# Patient Record
Sex: FEMALE | Race: WHITE | Employment: FULL TIME | URBAN - NONMETROPOLITAN AREA
[De-identification: names, ages, dates, MRNs, and addresses within clinical notes are randomized per-mention and may not be internally consistent; named-entity substitution may affect disease eponyms.]

---

## 2023-02-15 RX ORDER — FERROUS SULFATE 325(65) MG
325 TABLET ORAL
COMMUNITY

## 2023-02-21 ENCOUNTER — OFFICE VISIT (OUTPATIENT)
Dept: CARDIOLOGY CLINIC | Age: 43
End: 2023-02-21
Payer: OTHER GOVERNMENT

## 2023-02-21 VITALS
DIASTOLIC BLOOD PRESSURE: 76 MMHG | OXYGEN SATURATION: 99 % | BODY MASS INDEX: 43.07 KG/M2 | HEART RATE: 60 BPM | SYSTOLIC BLOOD PRESSURE: 128 MMHG | HEIGHT: 66 IN | WEIGHT: 268 LBS

## 2023-02-21 DIAGNOSIS — R00.2 PALPITATION: Primary | ICD-10-CM

## 2023-02-21 DIAGNOSIS — R55 SYNCOPE, UNSPECIFIED SYNCOPE TYPE: ICD-10-CM

## 2023-02-21 PROCEDURE — 93000 ELECTROCARDIOGRAM COMPLETE: CPT | Performed by: INTERNAL MEDICINE

## 2023-02-21 PROCEDURE — 99205 OFFICE O/P NEW HI 60 MIN: CPT | Performed by: INTERNAL MEDICINE

## 2023-02-21 NOTE — PROGRESS NOTES
[place] is a 38 y.o. female presents with syncope. She said she works at [employer] and she was walking around and felt lightheaded. She sat down to have a bowel movement and then when she stood up her heart rate was fast although she did not lose consciousness. She felt like she needed to hold onto something or fall and when she got in the car she was extremely thirsty. She normally drinks water and normally her urine has some color to it. She has had orthostatic symptoms in the past.      Review of Systems   Constitutional: Negative for fever, chills, diaphoresis, activity change, appetite change, fatigue and unexpected weight change. Eyes: Negative for photophobia, pain, redness and visual disturbance. Respiratory: Negative for apnea, cough, chest tightness, shortness of breath, wheezing and stridor. Cardiovascular: Negative for chest pain, palpitations and leg swelling. Gastrointestinal: Negative for abdominal distention. Genitourinary: Negative for dysuria, urgency and frequency. Musculoskeletal: Negative for myalgias, arthralgias and gait problem. Skin: Negative for color change, pallor, rash and wound. Neurological: Negative for dizziness, tremors, speech difficulty, weakness and numbness. Hematological: Does not bruise/bleed easily. Psychiatric/Behavioral: Negative.         Past Medical History:   Diagnosis Date    Syncope        Past Surgical History:   Procedure Laterality Date    ACHILLES TENDON SURGERY Bilateral     ENDOMETRIAL ABLATION  01/31/2023       Family History   Problem Relation Age of Onset    Heart Disease Mother     Diabetes Mother     Heart Surgery Mother        Social History     Socioeconomic History    Marital status:      Spouse name: Not on file    Number of children: Not on file    Years of education: Not on file    Highest education level: Not on file   Occupational History    Not on file   Tobacco Use    Smoking status: Never    Smokeless tobacco: Never   Vaping Use    Vaping Use: Never used   Substance and Sexual Activity    Alcohol use: Not Currently    Drug use: Not Currently    Sexual activity: Not on file   Other Topics Concern    Not on file   Social History Narrative    Not on file     Social Determinants of Health     Financial Resource Strain: Not on file   Food Insecurity: Not on file   Transportation Needs: Not on file   Physical Activity: Not on file   Stress: Not on file   Social Connections: Not on file   Intimate Partner Violence: Not on file   Housing Stability: Not on file       Allergies   Allergen Reactions    Hydrocodone Bitartrate Er      Vicodin         Current Outpatient Medications:     ferrous sulfate (IRON 325) 325 (65 Fe) MG tablet, Take 325 mg by mouth daily (with breakfast), Disp: , Rfl:     PE:  Vitals:    02/21/23 0905   BP: 128/76   Pulse: 60   SpO2: 99%   Weight: 268 lb (121.6 kg)   Height: 5' 6\" (1.676 m)       Estimated body mass index is 43.26 kg/m² as calculated from the following:    Height as of this encounter: 5' 6\" (1.676 m). Weight as of this encounter: 268 lb (121.6 kg). Constitutional: She is oriented to person, place, and time. She appears well-developed and well-nourished in no acute distress. Neck:  Neck supple without JVD present. No trachea deviation present. No thyromegaly present. Eyes:Conjunctivae and EOM are normal. Pupils equal and reactive to light. ENT:Hearing appears normal.conjunctiva and lids are normal, ears and nose appear normal.  Cardiovascular: Normal rate, S1-S2 regular rhythm, normal heart sounds. No murmur ascultated. No gallop and no friction rub. No carotid bruits. No peripheral edema. Pulmonary/Chest:  Lungs clear to auscultation bilaterally without evidence of respiratory distress. She without wheezes. She without rales or ronchi. Musculoskeletal: Normal range of motion. Gait is normal no assitive device. Head is normocephalic and atraumatic.   Skin: Skin is warm and dry without rash or pallor. Psychiatric:She is alert and oriented to person, place, and time. She has a normal mood and affect. Her behavior is normal. Thought content normal.       No results found for: CREATININE, HGB, PROBNP        ECG 02/21/23    Normal sinus         Assessment, Recommendations, & Plan:  38 y.o. female with orthostasis. She does not want to take a medication for this. I encouraged hydration and salt intake. She is not having any ischemic symptoms and only has family history is a risk factor so I think we can forego stress testing. I will perform a 2D echo to ensure she has normal left ventricular function but otherwise she does not want to take medication for this. Disposition - RTC as needed    Please do not hesitate to contact me for any questions or concerns. Dr. Cristine Cross.  [place]  Electrophysiology and Cardiology  [facility] [place], Cardiology  [phone]

## 2023-02-27 ENCOUNTER — HOSPITAL ENCOUNTER (OUTPATIENT)
Dept: NON INVASIVE DIAGNOSTICS | Age: 43
Discharge: HOME | End: 2023-02-27
Payer: OTHER GOVERNMENT

## 2023-02-27 DIAGNOSIS — R55 SYNCOPE, UNSPECIFIED SYNCOPE TYPE: ICD-10-CM

## 2023-02-27 LAB
LV EF: 60 %
LVEF MODALITY: NORMAL

## 2023-02-27 PROCEDURE — 93306 TTE W/DOPPLER COMPLETE: CPT

## 2023-03-29 ENCOUNTER — OFFICE VISIT (OUTPATIENT)
Dept: CARDIOLOGY CLINIC | Age: 43
End: 2023-03-29
Payer: OTHER GOVERNMENT

## 2023-03-29 VITALS
HEIGHT: 66 IN | WEIGHT: 273 LBS | DIASTOLIC BLOOD PRESSURE: 72 MMHG | BODY MASS INDEX: 43.87 KG/M2 | OXYGEN SATURATION: 98 % | HEART RATE: 76 BPM | SYSTOLIC BLOOD PRESSURE: 116 MMHG

## 2023-03-29 DIAGNOSIS — R55 SYNCOPE, UNSPECIFIED SYNCOPE TYPE: Primary | ICD-10-CM

## 2023-03-29 PROCEDURE — 99214 OFFICE O/P EST MOD 30 MIN: CPT | Performed by: INTERNAL MEDICINE

## 2023-03-29 NOTE — PROGRESS NOTES
file   Stress: Not on file   Social Connections: Not on file   Intimate Partner Violence: Not on file   Housing Stability: Not on file       Allergies   Allergen Reactions    Hydrocodone Bitartrate Er      Vicodin         Current Outpatient Medications:     ferrous sulfate (IRON 325) 325 (65 Fe) MG tablet, Take 325 mg by mouth daily (with breakfast), Disp: , Rfl:     PE:  Vitals:    03/29/23 0956   BP: 116/72   Pulse: 76   SpO2: 98%   Weight: 273 lb (123.8 kg)   Height: 5' 6\" (1.676 m)       Estimated body mass index is 44.06 kg/m² as calculated from the following:    Height as of this encounter: 5' 6\" (1.676 m). Weight as of this encounter: 273 lb (123.8 kg). Constitutional: She is oriented to person, place, and time. She appears well-developed and well-nourished in no acute distress. Neck:  Neck supple without JVD present. No trachea deviation present. No thyromegaly present. Eyes:Conjunctivae and EOM are normal. Pupils equal and reactive to light. ENT:Hearing appears normal.conjunctiva and lids are normal, ears and nose appear normal.  Cardiovascular: Normal rate, S1-S2 regular  rhythm, normal heart sounds. No no murmur ascultated. No gallop and no friction rub. No carotid bruits. No peripheral edema. Pulmonary/Chest:  Lungs clear to auscultation bilaterally without evidence of respiratory distress. She without wheezes. She without rales or ronchi. Musculoskeletal: Normal range of motion. Gait is normal  Head is normocephalic and atraumatic. Skin: Skin is warm and dry without rash or pallor. Psychiatric:She is alert and oriented to person, place, and time. She has a normal mood and affect. Her behavior is normal. Thought content normal.       No results found for: CREATININE, HGB, PROBNP      Assessment, Recommendations, & Plan:  38 y.o. female with preoperative visit.   I would recommend proceeding with surgery as she has normal exercise tolerance and no cardiovascular symptoms or risk

## 2023-10-26 ENCOUNTER — OFFICE VISIT (OUTPATIENT)
Dept: ENT CLINIC | Age: 43
End: 2023-10-26
Payer: OTHER GOVERNMENT

## 2023-10-26 VITALS
SYSTOLIC BLOOD PRESSURE: 126 MMHG | BODY MASS INDEX: 43.71 KG/M2 | WEIGHT: 272 LBS | DIASTOLIC BLOOD PRESSURE: 78 MMHG | HEIGHT: 66 IN

## 2023-10-26 DIAGNOSIS — H65.90 FLUID LEVEL BEHIND TYMPANIC MEMBRANE, UNSPECIFIED LATERALITY: ICD-10-CM

## 2023-10-26 DIAGNOSIS — R13.14 PHARYNGOESOPHAGEAL DYSPHAGIA: Primary | ICD-10-CM

## 2023-10-26 PROCEDURE — 99203 OFFICE O/P NEW LOW 30 MIN: CPT | Performed by: PHYSICIAN ASSISTANT

## 2023-10-26 PROCEDURE — 31575 DIAGNOSTIC LARYNGOSCOPY: CPT | Performed by: PHYSICIAN ASSISTANT

## 2023-10-26 RX ORDER — FLUTICASONE PROPIONATE 50 MCG
2 SPRAY, SUSPENSION (ML) NASAL 2 TIMES DAILY
Qty: 16 G | Refills: 2 | Status: SHIPPED | OUTPATIENT
Start: 2023-10-26

## 2023-10-26 RX ORDER — OXYMETAZOLINE HYDROCHLORIDE 0.05 G/100ML
SPRAY NASAL
Qty: 30 ML | Refills: 0 | Status: SHIPPED | OUTPATIENT
Start: 2023-10-26

## 2023-10-26 ASSESSMENT — ENCOUNTER SYMPTOMS
EYE DISCHARGE: 0
VOICE CHANGE: 0
EYE PAIN: 0
FACIAL SWELLING: 0
SORE THROAT: 0
TROUBLE SWALLOWING: 0
SINUS PRESSURE: 0
SINUS PAIN: 0
RHINORRHEA: 0

## 2023-10-26 NOTE — ASSESSMENT & PLAN NOTE
Left middle ear effusion-mild to moderate  Plan: I will place the patient on Afrin nasal spray and Flonase. She was advised to call if this does not resolve.

## 2023-10-26 NOTE — ASSESSMENT & PLAN NOTE
Dysphagia-suspicious for possible esophageal dysmotility  Plan: I recommended getting an esophagram for further evaluation. I will call her the results with further recommendations to follow.

## 2023-10-26 NOTE — PROGRESS NOTES
[facility]  Roberto Lynne is a pleasant 78-year-old  female that was referred by Ada Bravo due to problems with dysphagia. She reports that she has had issues with this off and on for several months. She reports that almost on a daily basis she gets choked on liquids and solid foods. She underwent upper GI endoscopy by Dr. Daniele Jurado were she was found to have some mild gastritis with no other findings appreciated. Overall is was believed that she may have a celiac syndrome. Patient denies any issues with vocal hoarseness or any medical history of diabetes or any family history of any GI related problems. Allergies: Hydrocodone bitartrate er      Current Outpatient Medications   Medication Sig Dispense Refill    Loratadine (CLARITIN PO) Take by mouth      fluticasone (FLONASE) 50 MCG/ACT nasal spray 2 sprays by Each Nostril route in the morning and 2 sprays in the evening. 16 g 2    oxymetazoline (AFRIN) 0.05 % nasal spray 2 squirts to each nostril twice a day x 7 days 30 mL 0    ferrous sulfate (IRON 325) 325 (65 Fe) MG tablet Take 325 mg by mouth daily (with breakfast) (Patient not taking: Reported on 10/26/2023)       No current facility-administered medications for this visit. Past Surgical History:   Procedure Laterality Date    ACHILLES TENDON SURGERY Bilateral     ENDOMETRIAL ABLATION  01/31/2023       Past Medical History:   Diagnosis Date    Celiac syndrome     Syncope        Family History   Problem Relation Age of Onset    Heart Disease Mother     Diabetes Mother     Heart Surgery Mother        Social History     Tobacco Use    Smoking status: Never    Smokeless tobacco: Never   Substance Use Topics    Alcohol use: Not Currently           REVIEW OF SYSTEMS:  all other systems reviewed and are negative  Review of Systems   Constitutional:  Negative for chills and fever.    HENT:  Negative for congestion, dental problem, ear discharge, ear pain, facial

## 2023-11-03 ENCOUNTER — TELEPHONE (OUTPATIENT)
Dept: ENT CLINIC | Age: 43
End: 2023-11-03

## 2023-11-03 NOTE — TELEPHONE ENCOUNTER
[place] [facility] [place] 9[place] She v/u.

## 2023-11-14 ENCOUNTER — TELEPHONE (OUTPATIENT)
Dept: ENT CLINIC | Age: 43
End: 2023-11-14

## 2023-11-15 NOTE — TELEPHONE ENCOUNTER
I called and left a message on the patient's cell phone voicemail that her esophagram was normal with no dysphagia or strictures. Incidentally she was found to have a small hiatal hernia. Due to her esophagram and laryngoscopy being unremarkable, she was advised that if she continues with symptoms, would recommend seeing a gastroenterologist for further evaluation. At this point she is follow-up with me as needed.       Electronically signed by Nisa Johnson PA-C on 11/14/23 at 6:17 PM CST